# Patient Record
(demographics unavailable — no encounter records)

---

## 2025-04-09 NOTE — DATA REVIEWED
[de-identified] : 3/31/2025: X-rays left tibia/fib, 2 views, obtained in cast today revealing nondisplaced proximal tibia fracture with unchanged alignment when compared to emergency room imaging.  Skeletally immature.

## 2025-04-09 NOTE — REVIEW OF SYSTEMS
[Change in Activity] : change in activity [Fever Above 102] : no fever [Malaise] : no malaise [Rash] : no rash [Itching] : no itching [Redness] : no redness [Nasal Stuffiness] : no nasal congestion [Congestion] : no congestion [Vomiting] : no vomiting [Diarrhea] : no diarrhea [Constipation] : no constipation [Limping] : limping [Joint Pains] : arthralgias [Sleep Disturbances] : ~T no sleep disturbances

## 2025-04-09 NOTE — REASON FOR VISIT
[Post ER] : a post ER visit [Patient] : patient [Parents] : parents [Initial Evaluation] : an initial evaluation [FreeTextEntry1] : left proximal tibia fractre

## 2025-04-09 NOTE — HISTORY OF PRESENT ILLNESS
[FreeTextEntry1] : 3-year-old female, otherwise healthy reports injury [place] [employer] [place] when [place] with another child on 3/23/2025.  Mother reports resulting left knee pain and swelling.  She was refusing to weight-bear.  She was initially [facility] where x-rays were done revealing proximal tibia fracture.  She was transferred to [facility] [place].  She was placed in a long-leg cast and has been nonweightbearing on the left leg.  She has returned to school but remains out of all gym and playgrounds.  Mother reports she has been complaining of left heel pain in cast for 3 days following cast application.  Pain was waking her up from sleep.  She is no longer complaining of pain in the heel.  She is not taking pain medication.  She is now comfortable in cast.  Mother denies difficulty with cast care.

## 2025-04-09 NOTE — PROCEDURE
[de-identified] : Heel of left long-leg cast removed with no skin abrasions from casting.  Procedure tolerated well.  Rewrapped with Coban.

## 2025-04-09 NOTE — PHYSICAL EXAM
[FreeTextEntry1] : General: Patient is awake and alert and in no acute distress, oriented to person, place, and time. Well developed, well nourished, cooperative.   Skin: The skin is intact, warm, pink, and dry over the area examined.    Eyes: normal conjunctiva, normal eyelids and pupils were equal and round.   ENT: normal ears, normal nose and normal lips.  Cardiovascular: There is brisk capillary refill in the digits of the affected extremity. They are symmetric pulses in the bilateral upper and lower extremities, positive peripheral pulses, brisk capillary refill, but no peripheral edema.  Respiratory: The patient is in no apparent respiratory distress. They're taking full deep breaths without use of accessory muscles or evidence of audible wheezes or stridor without the use of a stethoscope, normal respiratory effort.   Musculoskeletal: Focused examination left lower extremity: Long-leg cast in place, clean, dry, intact, no skin abrasions along cast edges.  Casting over left heel has been removed for inspection today with no skin abrasions along heel, slight erythema Toes are warm and pink and moving freely with brisk capillary refill Sensory grossly intact distally

## 2025-04-09 NOTE — HISTORY OF PRESENT ILLNESS
[FreeTextEntry1] : 3-year-old female, otherwise healthy reports injury while [place] [employer] [place] when she collided with another child on 3/23/2025.  Mother reports resulting left knee pain and swelling.  She was refusing to weight-bear.  She was initially seen at [facility] where x-rays were done revealing proximal tibia fracture.  She was transferred to [facility] [place] for casting.  She was placed in a long-leg cast and has been nonweightbearing on the left leg.  She has returned to school but remains out of all gym and playgrounds.  Mother reports she has been complaining of left heel pain in cast for 3 days following cast application.  Pain was waking her up from sleep.  She is no longer complaining of pain in the heel.  She is not taking pain medication.  She is now comfortable in cast.  Mother denies difficulty with cast care.

## 2025-04-09 NOTE — ASSESSMENT
[FreeTextEntry1] : 3-year-old female with left nondisplaced proximal tibia fracture occurring while jumping on trampoline at birthday party on 3/23/2025.  Today's assessment was performed with the assistance of the patient's parent as an independent historian to corroborate the patients history. Clinical exam and imaging reviewed with patient and family at length.  Natural healing of above fracture has been discussed. Heel of cast has been removed today with no skin abrasions noted.  Cast has been rewrapped with Coban.  She will continue to remain nonweightbearing on the left leg.  She will continue with long-leg cast for the next 3 weeks.  Cast care instructions reviewed.  She will return for follow-up in 3 weeks.  X-rays of left tibia in cast at that time.  If healing is adequate, cast will be removed at that time.  Absolutely no gym, sports, playgrounds.   All questions answered, understanding verbalized. Parent and patient in agreement with plan of care.  I, Mi Murcia, have acted as a scribe and documented the above information for Dr. Salvador.  This note was generated using Dragon medical dictation software. A reasonable effort has been made for proofreading its contents, but typos may still remain. If there are any questions or points of clarification needed please do not hesitate to contact my office.

## 2025-04-09 NOTE — END OF VISIT
[FreeTextEntry3] : IShahram MD, personally saw and evaluated the patient and developed the plan as documented above. I concur or have edited the note as appropriate.

## 2025-04-09 NOTE — PROCEDURE
[de-identified] : Heel of left long-leg cast removed with no skin abrasions from casting.  Procedure tolerated well.  Rewrapped with Coban.

## 2025-04-09 NOTE — DATA REVIEWED
[de-identified] : 3/31/2025: X-rays left tibia/fib, 2 views, obtained in cast today revealing nondisplaced proximal tibia fracture with unchanged alignment when compared to emergency room imaging.  Skeletally immature.

## 2025-04-21 NOTE — REVIEW OF SYSTEMS
[Change in Activity] : change in activity [Limping] : limping [Fever Above 102] : no fever [Malaise] : no malaise [Rash] : no rash [Itching] : no itching [Redness] : no redness [Nasal Stuffiness] : no nasal congestion [Congestion] : no congestion [Vomiting] : no vomiting [Diarrhea] : no diarrhea [Constipation] : no constipation [Joint Pains] : no arthralgias [Sleep Disturbances] : ~T no sleep disturbances

## 2025-04-21 NOTE — DATA REVIEWED
[de-identified] : Left tib-fib 2 view radiographs were obtained and independently reviewed during today's visit: Continued visualization of a nondisplaced proximal tibia fracture with unchanged alignment. Interval healing noted.  Skeletally immature.

## 2025-04-21 NOTE — ASSESSMENT
[FreeTextEntry1] : 3-year-old female with left nondisplaced proximal tibia fracture occurring while jumping on trampoline at birthday party on 3/23/2025, 4 weeks ago. Overall doing well  -We discussed the interval progress, physical exam, and all available radiographs at length during today's visit with patient and her parent/guardian who served as an independent historian due to child's age and unreliable nature of history. - Left tib-fib 2 view radiographs were obtained and independently reviewed during today's visit: Continued visualization of a nondisplaced proximal tibia fracture with unchanged alignment. Interval healing noted.  Skeletally immature. -The etiology, pathoanatomy, treatment modalities, and expected natural history of the injury were discussed at length today. -Clinically, she is doing very well and tolerating her long leg cast without difficulty. She denies any pain in the cast at this time. - Her long-leg cast was removed today.  She tolerated the procedure well. -Based on radiographs obtained today no further immobilization is warranted. -She may now begin to weight-bear as tolerated on the left lower extremity.  It was discussed she initially may not want to walk.  When she begins to walk it likely will be with a limp and out-toeing gait.  This is normal and will improve in time. - She is to continue to refrain from playgrounds, bouncy houses and trampolines. -We will plan to see her back in clinic in approximately 3 weeks repeat clinical evaluation and new left tib-fib radiographs.  All questions and concerns were addressed today. Parent and patient verbalize understanding and agree with plan of care.   I, Katharina Murcia, have acted as a scribe and documented the above information for Dr. Salvador.   This note was created using Dragon Voice Recognition Software and may have been partially created using [employer] software which was then reviewed and edited to the best of my ability. Sporadic inaccurate translation may have occurred. If there are any questions about content of the note, please contact the office for clarification.

## 2025-04-21 NOTE — HISTORY OF PRESENT ILLNESS
[FreeTextEntry1] : 3-year-old female, otherwise healthy female with a left proximal tibia fracture sustained on 3/23/2025.  Per report she was at a trampoline park when she collided with another child.  She had immediate pain and swelling.  She refused to bear weight.  [facility] where radiographs were obtained and a proximal tibia fracture was noted.  She was transferred to [facility] [place] for casting.  She was placed into a well-padded long-leg cast.  On initial evaluation due to complaints of heel pain in her long-leg cast was windowed. Please see prior clinic notes for additional information.   Today, she reports she is doing well.  She has no pain in the cast.  She has been attempting to walk in the cast.  She denies any need for pain medication.  She presents today for repeat radiographs and continued management of the above.

## 2025-04-21 NOTE — REASON FOR VISIT
[Follow Up] : a follow up visit [Patient] : patient [Parents] : parents [FreeTextEntry1] : left proximal tibia fracture sustained on 3/23/25

## 2025-04-21 NOTE — PHYSICAL EXAM
[FreeTextEntry1] : GENERAL: alert, cooperative, in NAD SKIN: The skin is intact, warm, pink and dry over the area examined. EYES: Normal conjunctiva, normal eyelids and pupils were equal and round. ENT: normal ears, normal nose and normal lips. CARDIOVASCULAR: brisk capillary refill, but no peripheral edema. RESPIRATORY: The patient is in no apparent respiratory distress. They're taking full deep breaths without use of accessory muscles or evidence of audible wheezes or stridor without the use of a stethoscope. Normal respiratory effort. ABDOMEN: not examined.   LLE: - Long-leg cast is in place. Good condition.  Removed today for examination. - No skin irritation or breakdown  - No swelling -Nontender about the fracture site - Able to fully flex and extend all toes without discomfort - No pain with passive stretch of the toes - Toes are warm and appear well perfused with brisk capillary refill - +2 DP pulse - Sensation is grossly intact to all exposed portions of the lower extremity - No evidence of lymphedema  Gait: Deferred

## 2025-05-20 NOTE — HISTORY OF PRESENT ILLNESS
[0] : currently ~his/her~ pain is 0 out of 10 [FreeTextEntry1] : 3-year-old female, otherwise healthy female with a left proximal tibia fracture sustained on 3/23/2025.  Per report she was at a trampoline park when she collided with another child.  She had immediate pain and swelling.  She refused to bear weight.  [facility] was [facility] seen [facility] where radiographs were obtained and a proximal tibia fracture was noted.  She was transferred to [facility] [place] for casting.  She was placed into a well-padded long-leg cast.  On initial evaluation due to complaints of heel pain in her long-leg cast was windowed. The cast was removed last visit.   Today, she reports she is doing well.  Mother states her limp has improved and she is not complaining of any pain.

## 2025-05-20 NOTE — REVIEW OF SYSTEMS
[Change in Activity] : change in activity [Limping] : limping [Fever Above 102] : no fever [Malaise] : no malaise [Rash] : no rash [Itching] : no itching [Redness] : no redness [Nasal Stuffiness] : no nasal congestion [Congestion] : no congestion [Vomiting] : no vomiting [Diarrhea] : no diarrhea [Constipation] : no constipation [Joint Pains] : no arthralgias [Joint Swelling] : no joint swelling [Sleep Disturbances] : ~T no sleep disturbances

## 2025-05-20 NOTE — PHYSICAL EXAM
[FreeTextEntry1] : GENERAL: alert, cooperative, in NAD SKIN: The skin is intact, warm, pink and dry over the area examined. EYES: Normal conjunctiva, normal eyelids and pupils were equal and round. ENT: normal ears, normal nose and normal lips. CARDIOVASCULAR: brisk capillary refill, but no peripheral edema. RESPIRATORY: The patient is in no apparent respiratory distress. They're taking full deep breaths without use of accessory muscles or evidence of audible wheezes or stridor without the use of a stethoscope. Normal respiratory effort. ABDOMEN: not examined.   LLE: - No gross deformity - Skin intact - No swelling - Nontender about the fracture site - Full flexion and extension of the knee and ankle.  - No knee joint effusion - Toes are warm and appear well perfused with brisk capillary refill - +2 DP pulse - Sensation is grossly intact to all exposed portions of the lower extremity, however, exam is limited due to age of child  Gait: Minimal non-painful limp noted. Bears full weight across bilateral lower extremities.

## 2025-05-20 NOTE — REASON FOR VISIT
[Follow Up] : a follow up visit [Patient] : patient [Mother] : mother [FreeTextEntry1] : left proximal tibia fracture sustained on 3/23/25

## 2025-05-20 NOTE — HISTORY OF PRESENT ILLNESS
[0] : currently ~his/her~ pain is 0 out of 10 [FreeTextEntry1] : 3-year-old female, otherwise healthy female with a left proximal tibia fracture sustained on 3/23/2025.  Per report she was at a trampoline park when she collided with another child.  She had immediate pain and swelling.  She refused to bear weight.  [facility] was [facility] where radiographs were obtained and a proximal tibia fracture was noted.  [place] [facility] [place]  She was placed into a well-padded long-leg cast.  On initial evaluation due to complaints of heel pain in her long-leg cast was windowed. The cast was removed last visit.   Today, she reports she is doing well.  Mother states her limp has improved and she is not complaining of any pain.

## 2025-05-20 NOTE — DATA REVIEWED
[de-identified] : Left tib-fib 2 view radiographs were obtained and independently reviewed during today's visit: Healed nondisplaced proximal tibia fracture with sclerosis noted. Skeletally immature.

## 2025-05-20 NOTE — ASSESSMENT
[FreeTextEntry1] : 3-year-old female with left nondisplaced proximal tibia fracture occurring while jumping on trampoline at birthday party on 3/23/2025, overall doing well  -We discussed the interval progress, physical exam, and all available radiographs at length during today's visit with patient and her parent/guardian who served as an independent historian due to child's age and unreliable nature of history. -Left tib-fib 2 view radiographs were obtained and independently reviewed during today's visit: Healed nondisplaced proximal tibia fracture with sclerosis noted. Skeletally immature.  -The etiology, pathoanatomy, treatment modalities, and expected natural history of the injury were discussed at length today. -Clinically, she is doing very well and is improving as far as limp.  -It was discussed that limp can occur for an additional 3-4 weeks until full strength is regained.  -May resume all activities at this time -We again discussed Cozen's phenomenon which occurs in approximately 30% of pediatric proximal tibia fractures. It is a late onset valgus deformity (5-15 months s/p injury) and largely undergoes spontaneous resolution. -She will f/u with us in 2 months for final gait check. No xrays unless clinical concerns.   All questions answered. Parent in agreement with the plan.  ICharlotte MPAS, PAC, have acted as a scribe and documented the above for Dr. Salvador.

## 2025-05-20 NOTE — DATA REVIEWED
[de-identified] : Left tib-fib 2 view radiographs were obtained and independently reviewed during today's visit: Healed nondisplaced proximal tibia fracture with sclerosis noted. Skeletally immature.